# Patient Record
Sex: MALE | Race: AMERICAN INDIAN OR ALASKA NATIVE
[De-identification: names, ages, dates, MRNs, and addresses within clinical notes are randomized per-mention and may not be internally consistent; named-entity substitution may affect disease eponyms.]

---

## 2020-10-05 ENCOUNTER — HOSPITAL ENCOUNTER (EMERGENCY)
Dept: HOSPITAL 5 - ED | Age: 50
LOS: 1 days | Discharge: HOME | End: 2020-10-06
Payer: SELF-PAY

## 2020-10-05 VITALS — DIASTOLIC BLOOD PRESSURE: 80 MMHG | SYSTOLIC BLOOD PRESSURE: 129 MMHG

## 2020-10-05 DIAGNOSIS — S43.491A: Primary | ICD-10-CM
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
## 2020-10-05 DIAGNOSIS — Z79.899: ICD-10-CM

## 2020-10-05 DIAGNOSIS — I10: ICD-10-CM
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
## 2020-10-05 PROCEDURE — 99283 EMERGENCY DEPT VISIT LOW MDM: CPT

## 2020-10-05 PROCEDURE — 96372 THER/PROPH/DIAG INJ SC/IM: CPT

## 2020-10-05 PROCEDURE — 73030 X-RAY EXAM OF SHOULDER: CPT

## 2020-10-05 NOTE — XRAY REPORT
RIGHT SHOULDER 3 VIEW(S)



INDICATION / CLINICAL INFORMATION:

MAIN



COMPARISON:

None available.

 

FINDINGS:



BONES / JOINT(S): No acute fracture or subluxation. No dislocation. No significant arthritis.



SOFT TISSUES: No significant abnormality.



ADDITIONAL FINDINGS: None.







Signer Name: Lele Redmond MD 

Signed: 10/5/2020 6:21 PM

Workstation Name: [id number]

## 2020-10-06 NOTE — EMERGENCY DEPARTMENT REPORT
ED Upper Extremity Inj HPI





- General


Chief Complaint: Shoulder Injury


Stated Complaint: RT SHOULDER INJURY


Source: patient


Mode of arrival: Ambulatory


Limitations: No Limitations





- History of Present Illness


Initial Comments: 





Patient is a 50-year-old -American male with a history of hypertension 

who presents to the ED with complaint of acute onset persistent severe right 

shoulder pain after a heavy object at work accidentally fell on his right 

shoulder over 12 hours ago.  Patient states that the pain is been persistent and

that he is unable to perform any active range of motion with the right arm or 

right shoulder because of severe right shoulder pain.  Patient denies head or 

neck injuries, dizziness, syncope, loss of consciousness, chest pain, shortness 

of breath, numbness and tingling or weakness of right arm, back pain, abdominal 

pain or change in vision.


MD Complaint: Injury to:: right, shoulder (pain)


-: Sudden, hour(s) (12)


Other Extremity Injury: Shoulder: Right (right shoulder pain)


Other Injuries: none


Handedness: right


Place: work


Severity scale (0 -10): 8


Improves With: immobilization


Worsens With: movement of extremity


Context: direct blow (Heavy object fell on his right shoulder at work)


Associated Symptoms: denies other symptoms.  denies: weakness, numbness, neck 

pain, suspects foreign body, nausea/vomiting, heard/felt popping sensat





- Related Data


                                  Previous Rx's











 Medication  Instructions  Recorded  Last Taken  Type


 


Ibuprofen [Motrin] 800 mg PO Q8HR PRN #30 tablet 10/06/20 Unknown Rx


 


methOCARBAMOL [Robaxin TAB] 750 mg PO Q8H PRN #24 tablet 10/06/20 Unknown Rx


 


traMADoL [Ultram] 50 mg PO Q6HR PRN #12 tablet 10/06/20 Unknown Rx











                                    Allergies











Allergy/AdvReac Type Severity Reaction Status Date / Time


 


No Known Allergies Allergy   Verified 16 00:46














ED Review of Systems


ROS: 


Stated complaint: RT SHOULDER INJURY


Other details as noted in HPI





Constitutional: denies: chills, fever


Eyes: denies: eye pain, eye discharge, vision change


ENT: denies: ear pain, throat pain


Respiratory: denies: cough, shortness of breath, wheezing


Cardiovascular: denies: chest pain, palpitations


Endocrine: no symptoms reported


Gastrointestinal: denies: abdominal pain, nausea, diarrhea


Genitourinary: denies: urgency, dysuria


Musculoskeletal: arthralgia (right shoulder pain).  denies: back pain, joint 

swelling


Skin: denies: rash, lesions


Neurological: denies: headache, weakness, paresthesias


Psychiatric: denies: anxiety, depression


Hematological/Lymphatic: denies: easy bleeding, easy bruising





ED Past Medical Hx





- Past Medical History


Previous Medical History?: Yes


Hx Hypertension: Yes





- Surgical History


Past Surgical History?: Yes


Additional Surgical History: right leg





- Social History


Smoking Status: Never Smoker


Substance Use Type: None





- Medications


Home Medications: 


                                Home Medications











 Medication  Instructions  Recorded  Confirmed  Last Taken  Type


 


Ibuprofen [Motrin] 800 mg PO Q8HR PRN #30 tablet 10/06/20  Unknown Rx


 


methOCARBAMOL [Robaxin TAB] 750 mg PO Q8H PRN #24 tablet 10/06/20  Unknown Rx


 


traMADoL [Ultram] 50 mg PO Q6HR PRN #12 tablet 10/06/20  Unknown Rx














ED Physical Exam





- General


Limitations: No Limitations


General appearance: alert, in no apparent distress





- Head


Head exam: Present: atraumatic, normocephalic, normal inspection





- Eye


Eye exam: Present: normal appearance, PERRL, EOMI


Pupils: Present: normal accommodation





- ENT


ENT exam: Present: normal exam, normal orophraynx, mucous membranes moist, TM's 

normal bilaterally, normal external ear exam





- Neck


Neck exam: Present: normal inspection, full ROM





- Respiratory


Respiratory exam: Present: normal lung sounds bilaterally.  Absent: respiratory 

distress, wheezes, rales, stridor, chest wall tenderness, accessory muscle use, 

prolonged expiratory





- Cardiovascular


Cardiovascular Exam: Present: regular rate, normal rhythm, normal heart sounds. 

 Absent: systolic murmur, diastolic murmur, rubs, gallop





- GI/Abdominal


GI/Abdominal exam: Present: soft, normal bowel sounds.  Absent: tenderness, 

guarding, rebound, hyperactive bowel sounds, hypoactive bowel sounds, 

organomegaly





- Extremities Exam


Extremities exam: Present: normal inspection, tenderness (Palpable right 

shoulder tenderness with limited range of motion due to pain), normal capillary 

refill.  Absent: full ROM (Limited range of motion right shoulder due to pain), 

pedal edema, joint swelling, calf tenderness





- Back Exam


Back exam: Present: normal inspection, full ROM.  Absent: tenderness, CVA 

tenderness (R), CVA tenderness (L), muscle spasm, paraspinal tenderness, 

vertebral tenderness





- Neurological Exam


Neurological exam: Present: alert, oriented X3, CN II-XII intact, normal gait, 

reflexes normal





- Psychiatric


Psychiatric exam: Present: normal affect, normal mood





- Skin


Skin exam: Present: warm, dry, intact, normal color.  Absent: rash





ED Course





                                   Vital Signs











  10/05/20





  17:53


 


Temperature 98.9 F


 


Pulse Rate 77


 


Respiratory 18





Rate 


 


Blood Pressure 129/80


 


O2 Sat by Pulse 100





Oximetry 














ED Medical Decision Making





- Radiology Data


Radiology results: report reviewed, image reviewed





Findings





[facility] 


[address] [place] [address] 





XRay Report 


Signed 





 Patient: WILMAN LARA MR#: [id number] 


 [id number] 


 : 1970 Acct:[id number] 





 Age/Sex: 50 / M ADM Date: 10/05/20 





 Loc: ED 


 Attending Dr: 








 Ordering Physician: LULU REED MD 


 Date of Service: 10/05/20 


 Procedure(s): XR shoulder 2+V RT 


 Accession Number(s): [id number] 





 cc: ED MD BRIANNA 





 Fluoro Time In Minutes: 





 RIGHT SHOULDER 3 VIEW(S) 





INDICATION / CLINICAL INFORMATION: 


MAIN 





COMPARISON: 


None available. 





FINDINGS: 





BONES / JOINT(S): No acute fracture or subluxation. No dislocation. No 

significant arthritis. 





SOFT TISSUES: No significant abnormality. 





ADDITIONAL FINDINGS: None. 











Signer Name: Bola Redmond MD 


Signed: 10/5/2020 6:21 PM 


Workstation Name: [id number] 








 Transcribed By: RH 


 Dictated By: BOLA REDMOND III 


 Electronically Authenticated By: BOLA REDMOND III 


 Signed Date/Time: 10/05/20 1821 











 DD/DT: 10/05/20 1820 


 TD/TT: 





- Medical Decision Making





This is a 50-year-old -American male with a history of hypertension who 

presents to the ED with complaint of acute onset persistent severe right 

shoulder pain after a heavy object at work accidentally fell on his right 

shoulder over 12 hours ago.  Patient states that the pain is been persistent and

 that he is unable to perform any active range of motion with the right arm or 

right shoulder because of severe right shoulder pain.  In the ED, patient is 

alert and oriented x3 and is not in distress but appears to be in significant 

pain.  Patient was treated for pain in the ED and right shoulder x-ray shows no 

acute fractures or subluxations.  The patient right shoulder was immobilized in 

an arm sling and the patient was discharged home on pain medications and muscle 

relaxants and was given a referral to the orthopedic surgeon Dr. Craig for 

follow-up and further evaluation.  Patient was advised to contact Dr. Craig's 

office first thing in the morning on 2020 to schedule a follow-up 

appointment.  Patient was advised return to the ED immediately if symptoms get 

worse, otherwise follow-up with his primary care physician in 7 to 10 days for 

reevaluation.





- Differential Diagnosis


Shoulder fracture; shoulder sprain; muscle strain; contusion


Critical care attestation.: 


If time is entered above; I have spent that time in minutes in the direct care 

of this critically ill patient, excluding procedure time.








ED Disposition


Clinical Impression: 


 Severe pain of right shoulder





Sprain of right shoulder


Qualifiers:


 Encounter type: initial encounter Shoulder sprain type: other part of shoulder 

region Qualified Code(s): S43.491A - Other sprain of right shoulder joint, 

initial encounter





Disposition:  TO HOME OR SELFCARE


Is pt being admited?: No


Does the pt Need Aspirin: No


Condition: Stable


Instructions:  Shoulder Sprain (ED), Musculoskeletal Pain (ED), Muscle Strain 

(ED)


Additional Instructions: 


The x-ray of the right shoulder shows no acute fractures or subluxations.  

Ligament injuries or tear cannot be ruled out.  Therefore take medications with 

food, drink plenty of fluids and follow-up with the orthopedic surgeon Dr. Craig for further evaluation.  Contact Dr. Craig's office first thing in the 

morning today October sixth 2020 to schedule a follow-up appointment.  Return to

 the ED immediately if symptoms get worse.  Otherwise follow-up with your 

primary care physician in 7 to 10 days for reevaluation.


Prescriptions: 


Ibuprofen [Motrin] 800 mg PO Q8HR PRN #30 tablet


 PRN Reason: Pain , Severe (7-10)


methOCARBAMOL [Robaxin TAB] 750 mg PO Q8H PRN #24 tablet


 PRN Reason: Muscle Spasm


traMADoL [Ultram] 50 mg PO Q6HR PRN #12 tablet


 PRN Reason: Pain


Referrals: 


LEONA KIRK MD [Staff Physician] - 7-10 days


BOLA CRAIG MD [Staff Physician] - ASAP


Forms:  Work/School Release Form(ED)


Time of Disposition: 00:13


Print Language: ENGLISH